# Patient Record
Sex: MALE | Race: WHITE | NOT HISPANIC OR LATINO | URBAN - METROPOLITAN AREA
[De-identification: names, ages, dates, MRNs, and addresses within clinical notes are randomized per-mention and may not be internally consistent; named-entity substitution may affect disease eponyms.]

---

## 2018-01-08 ENCOUNTER — ALLSCRIPTS OFFICE VISIT (OUTPATIENT)
Dept: OTHER | Facility: OTHER | Age: 48
End: 2018-01-08

## 2018-01-09 NOTE — PROGRESS NOTES
Assessment   1  Acute frontal sinusitis (461 1) (J01 10)   2  Encounter for preventive health examination (V70 0) (Z00 00)    Plan   Acute frontal sinusitis    · Azithromycin 250 MG Oral Tablet; TAKE 2 TABLETS ON DAY 1 THEN TAKE 1    TABLET A DAY FOR 4 DAYS    Discussion/Summary   Discussion Summary:    AB if worse  Chief Complaint   Chief Complaint Free Text Note Form: new pt--est    congestion/ headaches & body aches x 5days      History of Present Illness   HPI: see CC      Review of Systems   Complete-Male:      Constitutional: No fever or chills, feels well, no tiredness, no recent weight gain or weight loss  ENT: as noted in HPI  Cardiovascular: No complaints of slow heart rate, no fast heart rate, no chest pain, no palpitations, no leg claudication, no lower extremity  Respiratory: No complaints of shortness of breath, no wheezing, no cough, no SOB on exertion, no orthopnea or PND  Past Medical History   Active Problems And Past Medical History Reviewed: The active problems and past medical history were reviewed and updated today  Family History   Mother    1  No pertinent family history  Family History    2  Denied: Family history of substance abuse   3  No family history of mental disorder    Social History    · Never a smoker    Current Meds    1  No Reported Medications Recorded    Allergies   1  Penicillins    Vitals   Vital Signs    Recorded: [id number] 09:56AM   Heart Rate 74   Systolic 399   Diastolic 70   Height 5 ft 9 in   Weight 159 lb    BMI Calculated 23 48   BSA Calculated 1 87   O2 Saturation 98     Physical Exam        Constitutional      General appearance: No acute distress, well appearing and well nourished  Ears, Nose, Mouth, and Throat      Otoscopic examination: Tympanic membrance translucent with normal light reflex  Canals patent without erythema  Nasal mucosa, septum, and turbinates: Normal without edema or erythema         Oropharynx: Normal with no erythema, edema, exudate or lesions  Pulmonary      Auscultation of lungs: Clear to auscultation, equal breath sounds bilaterally, no wheezes, no rales, no rhonci            Signatures    Electronically signed by : Maira Sosa MD; Jan 8 2018 10:29AM EST                       (Author)

## 2018-01-22 VITALS
WEIGHT: 159 LBS | OXYGEN SATURATION: 98 % | SYSTOLIC BLOOD PRESSURE: 122 MMHG | HEIGHT: 69 IN | DIASTOLIC BLOOD PRESSURE: 70 MMHG | HEART RATE: 74 BPM | BODY MASS INDEX: 23.55 KG/M2

## 2018-01-23 NOTE — PROGRESS NOTES
Assessment    1  Acute frontal sinusitis (461 1) (J01 10)   2  Encounter for preventive health examination (V70 0) (Z00 00)    Plan  Acute frontal sinusitis    · Azithromycin 250 MG Oral Tablet; TAKE 2 TABLETS ON DAY 1 THEN TAKE 1  TABLET A DAY FOR 4 DAYS    Discussion/Summary  Advice and education were given regarding sunscreen use  Chief Complaint  ROUTINE PE      Advance Directives  Advance Directive [facility]:   NO - Patient does not have an advance health care directive  History of Present Illness  HM, Adult Male: The patient is being seen for a health maintenance evaluation  Social History: Household members include spouse  He is   Work status: working full time  The patient has never smoked cigarettes and has never used smokeless tobacco  He reports never drinking alcohol  The patient has no concerns about alcohol abuse  He has never used illicit drugs  General Health: The patient's health since the last visit is described as good  He has regular dental visits  He denies vision problems  He denies hearing loss  Immunizations status: up to date  Lifestyle:  He consumes a diverse and healthy diet  He does not have any weight concerns  He exercises regularly  He does not use tobacco  He denies alcohol use  He denies drug use  Reproductive health:  the patient is sexually active  Screening: Active Problems    1  Acute frontal sinusitis (461 1) (J01 10)    Family History  Mother    · No pertinent family history  Family History    · Denied: Family history of substance abuse   · No family history of mental disorder    Social History    · Never a smoker    Allergies    1  Penicillins    Vitals   Recorded: [id number] 09:56AM   Heart Rate 74   Systolic 380   Diastolic 70   Height 5 ft 9 in   Weight 159 lb    BMI Calculated 23 48   BSA Calculated 1 87   O2 Saturation 98     Physical Exam    Constitutional   General appearance: No acute distress, well appearing and well nourished      Neck Neck: Supple, symmetric, trachea midline, no masses  Thyroid: Normal, no thyromegaly  Cardiovascular   Auscultation of heart: Normal rate and rhythm, normal S1 and S2, no murmurs  Pedal pulses: 2+ bilaterally  Abdomen   Abdomen: Non-tender, no masses  Liver and spleen: No hepatomegaly or splenomegaly  Results/Data  PHQ-2 Adult Depression Screening [id number] 10:42AM User, Ahs     Test Name Result Flag Reference   PHQ-2 Adult Depression Score 0     Over the last two weeks, how often have you been bothered by any of the following problems?   Little interest or pleasure in doing things: Not at all - 0  Feeling down, depressed, or hopeless: Not at all - 0   PHQ-2 Adult Depression Screening Negative         Signatures   Electronically signed by : Ang Zepeda MD; Jan 8 2018 11:14AM EST                       (Author)